# Patient Record
(demographics unavailable — no encounter records)

---

## 2024-10-14 NOTE — HISTORY OF PRESENT ILLNESS
[Patient reported PAP Smear was normal] : Patient reported PAP Smear was normal [Y] : Patient is sexually active [Currently Active] : currently active [Men] : men [Patient refuses STI testing] : Patient refuses STI testing [FreeTextEntry1] : 23-year-old P2 here for evaluation of 1 month of vulvar irritation.  Also desires STD testing due to recent history of partner being unfaithful.  Uses condoms and typically withdrawal method. Has had vulvar itching and discharge that is white and clumpy for over 1 month early in September she had taken 3 days of Monistat and then had her menses after which she still had continued vulvar irritation.   GYN history: Remote history of Ureaplasma and occasional episodes of BV and yeast infections that occur at least once a year [PapSmeardate] : 2024 [TextBox_31] : [place]

## 2024-10-14 NOTE — REVIEW OF SYSTEMS
[Genital Rash/Irritation] : genital rash/irritation [Negative] : Gastrointestinal [Abn Vaginal bleeding] : no abnormal vaginal bleeding [Pelvic pain] : no pelvic pain

## 2024-10-14 NOTE — PHYSICAL EXAM
[Chaperone Present] : A chaperone was present in the examining room during all aspects of the physical examination [[id number]] : A chaperone was present during the pelvic exam. [Appropriately responsive] : appropriately responsive [Soft] : soft [Non-tender] : non-tender [Non-distended] : non-distended [No HSM] : No HSM [No Mass] : no mass [Oriented x3] : oriented x3 [Labia Majora] : normal [Labia Minora] : normal [Discharge] : discharge [Scant] : scant [White] : white [Thick] : thick [Normal] : normal [Normal Position] : in a normal position [Uterine Adnexae] : non-palpable [FreeTextEntry2] : [employer] [Foul Smelling] : not foul smelling [Tenderness] : nontender